# Patient Record
Sex: MALE | Race: BLACK OR AFRICAN AMERICAN | NOT HISPANIC OR LATINO | Employment: OTHER | URBAN - METROPOLITAN AREA
[De-identification: names, ages, dates, MRNs, and addresses within clinical notes are randomized per-mention and may not be internally consistent; named-entity substitution may affect disease eponyms.]

---

## 2018-01-30 ENCOUNTER — HOSPITAL ENCOUNTER (EMERGENCY)
Facility: OTHER | Age: 48
Discharge: HOME | End: 2018-01-30
Attending: EMERGENCY MEDICINE
Payer: COMMERCIAL

## 2018-01-30 VITALS
HEART RATE: 83 BPM | SYSTOLIC BLOOD PRESSURE: 125 MMHG | BODY MASS INDEX: 28.63 KG/M2 | WEIGHT: 200 LBS | HEIGHT: 70 IN | OXYGEN SATURATION: 96 % | TEMPERATURE: 99 F | DIASTOLIC BLOOD PRESSURE: 87 MMHG | RESPIRATION RATE: 17 BRPM

## 2018-01-30 DIAGNOSIS — J02.0 STREP PHARYNGITIS: Primary | ICD-10-CM

## 2018-01-30 PROCEDURE — 96372 THER/PROPH/DIAG INJ SC/IM: CPT

## 2018-01-30 PROCEDURE — 99283 EMERGENCY DEPT VISIT LOW MDM: CPT | Mod: 25

## 2018-01-30 PROCEDURE — 63600175 PHARM REV CODE 636 W HCPCS: Mod: JG | Performed by: PHYSICIAN ASSISTANT

## 2018-01-30 RX ORDER — DEXAMETHASONE SODIUM PHOSPHATE 4 MG/ML
12 INJECTION, SOLUTION INTRA-ARTICULAR; INTRALESIONAL; INTRAMUSCULAR; INTRAVENOUS; SOFT TISSUE
Status: COMPLETED | OUTPATIENT
Start: 2018-01-30 | End: 2018-01-30

## 2018-01-30 RX ORDER — OXAPROZIN 600 MG/1
600 TABLET, FILM COATED ORAL 2 TIMES DAILY PRN
Qty: 20 TABLET | Refills: 0 | Status: SHIPPED | OUTPATIENT
Start: 2018-01-30 | End: 2019-10-20

## 2018-01-30 RX ADMIN — PENICILLIN G BENZATHINE 1.2 MILLION UNITS: 1200000 INJECTION, SUSPENSION INTRAMUSCULAR at 05:01

## 2018-01-30 RX ADMIN — DEXAMETHASONE SODIUM PHOSPHATE 12 MG: 4 INJECTION, SOLUTION INTRAMUSCULAR; INTRAVENOUS at 05:01

## 2018-01-30 NOTE — ED PROVIDER NOTES
Encounter Date: 1/30/2018       History     Chief Complaint   Patient presents with    Sore Throat     sore throat with difficulty swallowing x 3 days. He states that he has been having night sweats, chills.      47-year-old male with no significant past medical history was a current some day smoker presents emergency department with complaints of sore throat since Saturday.  He states his symptoms have worsened.  He reports or limits of water without relief.  He reports subjective fever upon onset but denies any fever at this time.  He states that he attempted to eat earlier today however reports that he had to spend his food secondary to pain.  He admits to tolerating secretions and fluids.  He complains of pain that is a 10 out of 10.      The history is provided by the patient.     Review of patient's allergies indicates:   Allergen Reactions    Vicodin [hydrocodone-acetaminophen] Nausea Only     History reviewed. No pertinent past medical history.  Past Surgical History:   Procedure Laterality Date    ANKLE SURGERY       History reviewed. No pertinent family history.  Social History   Substance Use Topics    Smoking status: Current Some Day Smoker    Smokeless tobacco: Not on file    Alcohol use No      Comment: occasionally     Review of Systems   Constitutional: Positive for fever. Negative for chills.   HENT: Positive for sore throat and trouble swallowing. Negative for congestion, facial swelling and rhinorrhea.    Respiratory: Negative for cough and shortness of breath.    Cardiovascular: Negative for chest pain.   Gastrointestinal: Negative for nausea and vomiting.   Genitourinary: Negative for dysuria.   Musculoskeletal: Negative for back pain, neck pain and neck stiffness.   Skin: Negative for rash.   Neurological: Negative for weakness.   Hematological: Does not bruise/bleed easily.       Physical Exam     Initial Vitals [01/30/18 1458]   BP Pulse Resp Temp SpO2   133/86 98 18 98.7 °F (37.1 °C)  97 %      MAP       101.67         Physical Exam    Nursing note and vitals reviewed.  Constitutional: Vital signs are normal. He appears well-developed and well-nourished. He is not diaphoretic.  Non-toxic appearance. No distress.   HENT:   Head: Normocephalic and atraumatic.   Right Ear: External ear normal.   Left Ear: External ear normal.   Nose: Nose normal.   Mouth/Throat: Uvula is midline and mucous membranes are normal. Mucous membranes are not dry. No trismus in the jaw. No uvula swelling. Oropharyngeal exudate, posterior oropharyngeal edema and posterior oropharyngeal erythema present.   Patient has bilateral peritonsillar edema concerning for peritonsillar cellulitis without obvious abscess.  Uvula is midline.  Oral pharyngeal exudate noted.   Eyes: Conjunctivae, EOM and lids are normal. Pupils are equal, round, and reactive to light. No scleral icterus.   Neck: Normal range of motion and phonation normal. Neck supple.   Cardiovascular: Normal rate, regular rhythm and normal heart sounds. Exam reveals no gallop and no friction rub.    No murmur heard.  Pulmonary/Chest: Effort normal and breath sounds normal. No respiratory distress. He has no decreased breath sounds. He has no wheezes. He has no rhonchi. He has no rales.   Abdominal: Normal appearance.   Musculoskeletal: Normal range of motion.   No obvious deformities, moving all extremities, normal gait   Lymphadenopathy:     He has no cervical adenopathy.   Neurological: He is alert and oriented to person, place, and time. He has normal strength. No sensory deficit.   Skin: Skin is warm, dry and intact. No lesion and no rash noted. No erythema.   Psychiatric: He has a normal mood and affect. His speech is normal and behavior is normal. Judgment normal. Cognition and memory are normal.         ED Course   Procedures  Labs Reviewed - No data to display          Medical Decision Making:   History:   Old Medical Records: I decided to obtain old medical  records.  Initial Assessment:   47-year-old male with complaints consistent with strep pharyngitis.  Vital signs stable, afebrile, neurovascularly intact.  He is alert, healthy and nontoxic appearing.  He is in no apparent distress.  No focal neurological deficits.  Exam documented above.  Concerning for strep pharyngitis.  Patient does meet [facility] criteria for treatment.  I do not feel that testing is indicated.  No evidence of peritonsillar abscess, retropharyngeal abscess or Colton's angina.  ED Management:  He was administered Bicillin and Decadron in the emergency department.  We'll discharged home with oxaprozin and care instructions.  He is to follow-up with primary care physician in the next 48 hours or return for any worsening signs or symptoms.  Given strict return precautions.  He states understanding.  This patient was discussed with the attending physician who also evaluated him and agrees with treatment plan  Other:   I have discussed this case with another health care provider.       <> Summary of the Discussion: Ruben  This note was created using Dragon Medical dictation.  There may be typographical errors secondary to dictation.                     ED Course      Clinical Impression:     1. Strep pharyngitis          Disposition:   Disposition: Discharged  Condition: Stable                        Jenni Patel PA-C  01/30/18 3570

## 2018-01-30 NOTE — ED NOTES
Patient Identifiers for Quincy Hanson checked and correct  LOC: The patient is awake, alert and aware of environment with an appropriate affect, the patient is oriented x 3 and speaking appropriate.  APPEARANCE: Patient resting comfortably and in no acute distress. The patient is clean and well groomed. The patient's clothing is properly fastened.  SKIN: The skin is warm and dry. The patient has normal skin turgor and moist mucus membranes. No rashes or lesions upon observation. Skin Intact , no breakdown noted.  Musculoskeletal :  Normal range of motion noted. Moves all extremities well, No swelling or tenderness noted  RESPIRATORY: Airway is open and patent, respirations are spontaneous, patient has a normal effort and rate. Breath sounds are clear & equal, bilaterally.  CARDIAC: Patient has a normal rate and rhythm, no peripheral edema noted, capillary refill < 3 seconds.   ABDOMEN: Soft and non tender to palpation, no distention observed. Bowels Sounds are WNL all quads.  PULSES: 2+ radial & pedal pulses, symmetrical in all extremities.  NEUROLOGIC: PERRL, Pupils 3mm and reacts briskly to light. Motor strength 5/5 all extremities.  The pt's facial expression is symmetrical, patient moving all extremities, normal sensation in all extremities when touched with a finger.The patient is awake, alert and cooperative with a calm affect, patient is aware of environment.      Will continue to monitor

## 2019-10-20 ENCOUNTER — HOSPITAL ENCOUNTER (EMERGENCY)
Facility: OTHER | Age: 49
Discharge: HOME | End: 2019-10-20
Attending: EMERGENCY MEDICINE
Payer: COMMERCIAL

## 2019-10-20 VITALS
HEIGHT: 70 IN | SYSTOLIC BLOOD PRESSURE: 129 MMHG | WEIGHT: 220 LBS | TEMPERATURE: 98 F | OXYGEN SATURATION: 96 % | BODY MASS INDEX: 31.5 KG/M2 | RESPIRATION RATE: 16 BRPM | DIASTOLIC BLOOD PRESSURE: 79 MMHG | HEART RATE: 91 BPM

## 2019-10-20 DIAGNOSIS — M79.604 RIGHT LEG PAIN: Primary | ICD-10-CM

## 2019-10-20 PROCEDURE — 99284 EMERGENCY DEPT VISIT MOD MDM: CPT

## 2019-10-20 PROCEDURE — 25000003 PHARM REV CODE 250: Performed by: PHYSICIAN ASSISTANT

## 2019-10-20 RX ORDER — IBUPROFEN 600 MG/1
600 TABLET ORAL EVERY 6 HOURS PRN
Qty: 20 TABLET | Refills: 0 | Status: SHIPPED | OUTPATIENT
Start: 2019-10-20

## 2019-10-20 RX ORDER — METHOCARBAMOL 500 MG/1
1000 TABLET, FILM COATED ORAL 3 TIMES DAILY
Qty: 30 TABLET | Refills: 0 | Status: SHIPPED | OUTPATIENT
Start: 2019-10-20 | End: 2019-10-25

## 2019-10-20 RX ORDER — METHOCARBAMOL 500 MG/1
1000 TABLET, FILM COATED ORAL
Status: COMPLETED | OUTPATIENT
Start: 2019-10-20 | End: 2019-10-20

## 2019-10-20 RX ORDER — ACETAMINOPHEN 500 MG
1000 TABLET ORAL
Status: COMPLETED | OUTPATIENT
Start: 2019-10-20 | End: 2019-10-20

## 2019-10-20 RX ORDER — KETOROLAC TROMETHAMINE 10 MG/1
10 TABLET, FILM COATED ORAL
Status: COMPLETED | OUTPATIENT
Start: 2019-10-20 | End: 2019-10-20

## 2019-10-20 RX ADMIN — KETOROLAC TROMETHAMINE 10 MG: 10 TABLET, FILM COATED ORAL at 05:10

## 2019-10-20 RX ADMIN — METHOCARBAMOL TABLETS 1000 MG: 500 TABLET, COATED ORAL at 05:10

## 2019-10-20 RX ADMIN — ACETAMINOPHEN 1000 MG: 500 TABLET, FILM COATED ORAL at 05:10

## 2019-10-20 NOTE — ED PROVIDER NOTES
"Encounter Date: 10/20/2019       History     Chief Complaint   Patient presents with    Leg Pain     Pt says he has pain that starts in his right ankle and travels to his hip when he walks or lays down on it. Denies injury     Patient is 48 year old male who presents with complaints of right leg pain that has been present for three weeks PTA. He reports no inciting injury but does admit that symptoms seemed to worsen after a mechanical trip a fall while running about two weeks ago. He reports only feeling symptoms intermittently and admits it is almost like "I move the wrong way and something catches in my leg and makes me holler in pain". He reports that this has only happened a couple of times but he admits he is concerned that he is having a stroke. He denies fever, chills, nausea, vomiting, leg swelling, skin changes, bladder or bowel incontinence, saddle anesthesia. He is currently unaccompanied in the ER.         Review of patient's allergies indicates:   Allergen Reactions    Vicodin [hydrocodone-acetaminophen] Nausea Only     No past medical history on file.  Past Surgical History:   Procedure Laterality Date    ANKLE SURGERY       No family history on file.  Social History     Tobacco Use    Smoking status: Current Some Day Smoker   Substance Use Topics    Alcohol use: No     Comment: occasionally    Drug use: No     Review of Systems   Constitutional: Negative for fever.   HENT: Negative for sore throat.    Respiratory: Negative for shortness of breath.    Cardiovascular: Negative for chest pain.   Gastrointestinal: Negative for nausea.   Genitourinary: Negative for dysuria.   Musculoskeletal: Negative for back pain.        Right leg pain    Skin: Negative for rash.   Neurological: Negative for weakness.   Hematological: Does not bruise/bleed easily.       Physical Exam     Initial Vitals [10/20/19 1721]   BP Pulse Resp Temp SpO2   129/79 107 16 98.1 °F (36.7 °C) 96 %      MAP       --     "     Physical Exam    Nursing note and vitals reviewed.  Constitutional: He appears well-developed and well-nourished. He is not diaphoretic. No distress.   Healthy-appearing male in no acute distress or apparent pain. He makes good eye contact, speaks in clear full sentences and ambulates with ease.   HENT:   Head: Normocephalic and atraumatic.   Eyes: Conjunctivae and EOM are normal. Pupils are equal, round, and reactive to light. Right eye exhibits no discharge. Left eye exhibits no discharge. No scleral icterus.   Neck: Normal range of motion.   Cardiovascular: Normal rate, regular rhythm, normal heart sounds and intact distal pulses. Exam reveals no gallop and no friction rub.    No murmur heard.  Pulmonary/Chest: Breath sounds normal. He has no wheezes. He has no rhonchi. He has no rales.   Abdominal: Soft. Bowel sounds are normal. There is no tenderness. There is no rebound and no guarding.   Musculoskeletal: Normal range of motion. He exhibits no edema or tenderness.   There is no C T or L midline bony TTP crepitus or step-offs. There is negative straight leg raise. There is no reproducible TTP to the left leg. There is normal knee ROM. There is normal SAI. There is normal distal pulse.    Lymphadenopathy:     He has no cervical adenopathy.   Neurological: He is alert and oriented to person, place, and time. He has normal strength. No cranial nerve deficit or sensory deficit. GCS score is 15. GCS eye subscore is 4. GCS verbal subscore is 5. GCS motor subscore is 6.   Skin: Skin is warm. Capillary refill takes less than 2 seconds. No rash and no abscess noted. No erythema.   Psychiatric: He has a normal mood and affect. His behavior is normal. Thought content normal.         ED Course   Procedures  Labs Reviewed - No data to display       Imaging Results    None          Medical Decision Making:   ED Management:  Urgent evaluation a 48-year-old male who presents with complaints most consistent with muscle  spasm versus lumbar radiculopathy.  He is afebrile, nontoxic appearing, hemodynamically stable though initially tachycardic at 107 on triage exam.  Physical exam reveals normal bilateral lower extremities with normal pulses, strength against resistance in sensation to light touch.  There is normal range of motion at all joints.  Bony landmarks are palpated without any deformity or asymmetry.  There is no C, T, L midline bony tenderness crepitus or step-offs.  There is negative right straight leg raise.  I have considered but do not suspect acute neurovascular compromise including DVT, acute arterial compromise, cellulitis, fracture, dislocation.  Certainly ligamentous injury after his fall could be the culprit but there is no evidence of ligamentous laxity.  I suspect likely this is an element of sciatica and that symptoms will improve with nonsteroidal anti-inflammatories and nonsedating muscle relaxer.  First doses will be given here in the emergency department.  Patient is educated on importance of establishing care with [facility] contact information is provided to him.  He is educated on ED return precautions and verbalized understanding.  He is stable for discharge.    Of note d/c HR obtained by me and noted to be 91 bpm.                       Clinical Impression:       ICD-10-CM ICD-9-CM   1. Right leg pain M79.604 729.5                                Paulina Lambert PA-C  10/20/19 1816

## 2019-10-20 NOTE — ED TRIAGE NOTES
Patient reports Right leg pain for several weeks. Pt states the pain became more severe after falling a week ago.  Pt states the pain is now radiating to Right hip.  Pain 10/10.

## 2019-11-26 ENCOUNTER — HOSPITAL ENCOUNTER (EMERGENCY)
Facility: OTHER | Age: 49
Discharge: HOME | End: 2019-11-26
Attending: EMERGENCY MEDICINE
Payer: COMMERCIAL

## 2019-11-26 VITALS
HEART RATE: 91 BPM | OXYGEN SATURATION: 96 % | TEMPERATURE: 98 F | DIASTOLIC BLOOD PRESSURE: 88 MMHG | RESPIRATION RATE: 18 BRPM | BODY MASS INDEX: 31.5 KG/M2 | HEIGHT: 70 IN | WEIGHT: 220 LBS | SYSTOLIC BLOOD PRESSURE: 147 MMHG

## 2019-11-26 DIAGNOSIS — J34.89 RHINORRHEA: ICD-10-CM

## 2019-11-26 DIAGNOSIS — M54.31 SCIATICA OF RIGHT SIDE: Primary | ICD-10-CM

## 2019-11-26 PROCEDURE — 63600175 PHARM REV CODE 636 W HCPCS: Performed by: PHYSICIAN ASSISTANT

## 2019-11-26 PROCEDURE — 96372 THER/PROPH/DIAG INJ SC/IM: CPT

## 2019-11-26 PROCEDURE — 99284 EMERGENCY DEPT VISIT MOD MDM: CPT | Mod: 25

## 2019-11-26 PROCEDURE — 25000242 PHARM REV CODE 250 ALT 637 W/ HCPCS: Performed by: PHYSICIAN ASSISTANT

## 2019-11-26 PROCEDURE — 25000003 PHARM REV CODE 250: Performed by: PHYSICIAN ASSISTANT

## 2019-11-26 RX ORDER — METHOCARBAMOL 500 MG/1
1000 TABLET, FILM COATED ORAL
Status: COMPLETED | OUTPATIENT
Start: 2019-11-26 | End: 2019-11-26

## 2019-11-26 RX ORDER — FLUTICASONE PROPIONATE 50 MCG
2 SPRAY, SUSPENSION (ML) NASAL
Status: COMPLETED | OUTPATIENT
Start: 2019-11-26 | End: 2019-11-26

## 2019-11-26 RX ORDER — METHOCARBAMOL 500 MG/1
1000 TABLET, FILM COATED ORAL 3 TIMES DAILY PRN
Qty: 30 TABLET | Refills: 0 | Status: SHIPPED | OUTPATIENT
Start: 2019-11-26 | End: 2019-12-01

## 2019-11-26 RX ORDER — KETOROLAC TROMETHAMINE 30 MG/ML
10 INJECTION, SOLUTION INTRAMUSCULAR; INTRAVENOUS
Status: COMPLETED | OUTPATIENT
Start: 2019-11-26 | End: 2019-11-26

## 2019-11-26 RX ORDER — NAPROXEN 375 MG/1
375 TABLET ORAL 2 TIMES DAILY PRN
Qty: 30 TABLET | Refills: 0 | Status: SHIPPED | OUTPATIENT
Start: 2019-11-26

## 2019-11-26 RX ADMIN — FLUTICASONE PROPIONATE 100 MCG: 50 SPRAY, METERED NASAL at 09:11

## 2019-11-26 RX ADMIN — METHOCARBAMOL TABLETS 1000 MG: 500 TABLET, COATED ORAL at 09:11

## 2019-11-26 RX ADMIN — KETOROLAC TROMETHAMINE 10 MG: 30 INJECTION, SOLUTION INTRAMUSCULAR; INTRAVENOUS at 09:11

## 2019-11-27 NOTE — ED TRIAGE NOTES
Pt presents to the ed with c/o non traumatic R leg pain. Pt reports recently seen a month ago for the same pain and was prescribed medications but the meds are not working.    LOC: The patient is awake, alert, oriented and speaking appropriately at this time.  APPEARANCE: Patient resting comfortably and appears to be in no acute distress at this time. Patient is clean and well groomed, patient's clothing is properly fastened.  SKIN:The skin is warm and dry, color consistent with ethnicity, patient has normal skin turgor and moist mucus membranes, skin intact, no breakdown or brusing noted.  MUSCULOSKELETAL: +R leg pain. Patient moving all extremities well, no obvious swelling or deformities noted.  RESPIRATORY: Airway is open and patent, respirations are spontaneous, patient has a normal effort and rate, no accessory muscle use noted.  CARDIAC: Patient has a normal rate and rhythm, no periphreal edema noted in any extremity, capillary refill < 3 seconds in all extremities  ABDOMEN: Soft and non tender to palpation, no abdominal distention noted. Bowel sounds present in all four quadrants.  NEUROLOGIC: Eyes open spontaneously, behavior appropriate to situation, follows commands, facial expression symmetrical, bilateral hand grasp equal and even, purposeful motor response noted, normal sensation in all extremities when touched with a finger.

## 2019-11-27 NOTE — ED PROVIDER NOTES
"Encounter Date: 11/26/2019       History     Chief Complaint   Patient presents with    Leg Pain     R leg. "they told me I have a pinched nerve and the meds they gave me last time aren't working"     Patient is a 49-year-old male with no significant medical history who presents to the emergency department with the right leg pain. Patient states over the last several months he has had intermittent episodes of right leg pain.  He states the pain is a throbbing sensation.  He states the pain is worsened by walking and being active at work.  He states he also has pain with lying flat.  He denies any associated numbness or tingling.  He denies saddle anesthesia or bowel or bladder incontinence or retention.  He states he has been diagnosed with sciatica, but states he has not seen a back and Spine Specialist.  He denies any recent injury. He denies fevers or chills. Patient also reports congestion and rhinorrhea over the last 24 hr.  Denies cough.  Denies chest pain or shortness of breath.    The history is provided by the patient.     Review of patient's allergies indicates:   Allergen Reactions    Vicodin [hydrocodone-acetaminophen] Nausea Only     No past medical history on file.  Past Surgical History:   Procedure Laterality Date    ANKLE SURGERY       No family history on file.  Social History     Tobacco Use    Smoking status: Current Some Day Smoker   Substance Use Topics    Alcohol use: No     Comment: occasionally    Drug use: No     Review of Systems   Constitutional: Negative for activity change, appetite change, chills, fatigue and fever.   HENT: Positive for congestion and rhinorrhea. Negative for ear discharge, ear pain, sore throat and trouble swallowing.    Respiratory: Negative for cough and shortness of breath.    Cardiovascular: Negative for chest pain.   Gastrointestinal: Negative for abdominal pain, blood in stool, constipation, diarrhea, nausea and vomiting.   Genitourinary: Negative for " dysuria, flank pain and hematuria.   Musculoskeletal: Positive for back pain. Negative for neck pain and neck stiffness.   Skin: Negative for rash and wound.   Neurological: Negative for weakness, numbness and headaches.       Physical Exam     Initial Vitals [11/26/19 1912]   BP Pulse Resp Temp SpO2   (!) 154/77 102 16 98.6 °F (37 °C) 97 %      MAP       --         Physical Exam    Nursing note and vitals reviewed.  Constitutional: He appears well-developed and well-nourished. He is not diaphoretic.  Non-toxic appearance. No distress.   HENT:   Head: Normocephalic.   Right Ear: Hearing and external ear normal.   Left Ear: Hearing and external ear normal.   Nose: Nose normal.   Mouth/Throat: Uvula is midline, oropharynx is clear and moist and mucous membranes are normal. No oropharyngeal exudate.   Eyes: Conjunctivae are normal. Pupils are equal, round, and reactive to light.   Neck: Normal range of motion.   Cardiovascular: Normal rate and regular rhythm.   Pulmonary/Chest: Breath sounds normal.   Abdominal: Soft. Bowel sounds are normal. There is no tenderness.   Musculoskeletal:   No midline tenderness in the cervical, thoracic, or lumbar region.  No step-offs or crepitus noted.  No ecchymosis.  Normal strength in upper and lower extremities. Negative straight leg test bilaterally. No saddle anesthesia.  Normal strength in upper and lower extremities. No difficulty ambulating.  Tenderness in the right lumbar paraspinal region with tenderness over the right buttocks.   Lymphadenopathy:     He has no cervical adenopathy.   Neurological: He is alert and oriented to person, place, and time.   Skin: Skin is warm and dry. Capillary refill takes less than 2 seconds.   Psychiatric: He has a normal mood and affect.         ED Course   Procedures  Labs Reviewed - No data to display       Imaging Results    None          Medical Decision Making:   Initial Assessment:   Urgent evaluation of a 49-year-old male with no  significant medical history who presents to the emergency department with back pain. Patient is afebrile, nontoxic appearing, and hemodynamically stable. No history of injury. History of sciatica.  No midline tenderness of spine.  No evidence of vertebral fracture, spinal cord compression, cauda equina syndrome, or spinal cord abscess.  Patient will be treated with anti-inflammatories and muscle relaxers.  Patient also is complaining of nasal congestion and rhinorrhea.  Lungs are clear to auscultation.  There is nasal mucosal edema.  Patient will be given Flonase.  Patient is advised to follow up with PCP and [facility].  Patient is advised to return to the emergency department with any worsening symptoms or concerns.                                 Clinical Impression:       ICD-10-CM ICD-9-CM   1. Sciatica of right side M54.31 724.3   2. Rhinorrhea J34.89 478.19                             Sherine Lancaster PA-C  11/26/19 2103

## 2021-12-29 ENCOUNTER — IMMUNIZATION (OUTPATIENT)
Dept: PRIMARY CARE CLINIC | Facility: CLINIC | Age: 51
End: 2021-12-29

## 2021-12-29 DIAGNOSIS — Z23 NEED FOR VACCINATION: Primary | ICD-10-CM

## 2021-12-29 PROCEDURE — 91303 COVID-19,VECTOR-NR,RS-AD26,PF,0.5 ML DOSE VACCINE (JANSSEN): CPT | Mod: PBBFAC | Performed by: INTERNAL MEDICINE

## 2021-12-29 PROCEDURE — 0034A COVID-19,VECTOR-NR,RS-AD26,PF,0.5 ML DOSE VACCINE (JANSSEN): CPT | Mod: CV19,PBBFAC | Performed by: INTERNAL MEDICINE

## 2024-02-16 ENCOUNTER — HOSPITAL ENCOUNTER (EMERGENCY)
Facility: OTHER | Age: 54
Discharge: HOME | End: 2024-02-16
Attending: EMERGENCY MEDICINE
Payer: MEDICAID

## 2024-02-16 VITALS
BODY MASS INDEX: 34.36 KG/M2 | OXYGEN SATURATION: 100 % | TEMPERATURE: 98 F | SYSTOLIC BLOOD PRESSURE: 149 MMHG | HEART RATE: 87 BPM | RESPIRATION RATE: 14 BRPM | WEIGHT: 240 LBS | DIASTOLIC BLOOD PRESSURE: 95 MMHG | HEIGHT: 70 IN

## 2024-02-16 DIAGNOSIS — T54.91XA INGESTION OF CAUSTIC SUBSTANCE: Primary | ICD-10-CM

## 2024-02-16 LAB
ALBUMIN SERPL BCP-MCNC: 4 G/DL (ref 3.5–5.2)
ALP SERPL-CCNC: 79 U/L (ref 55–135)
ALT SERPL W/O P-5'-P-CCNC: 19 U/L (ref 10–44)
ANION GAP SERPL CALC-SCNC: 11 MMOL/L (ref 8–16)
AST SERPL-CCNC: 21 U/L (ref 10–40)
BASOPHILS # BLD AUTO: 0.03 K/UL (ref 0–0.2)
BASOPHILS NFR BLD: 0.5 % (ref 0–1.9)
BILIRUB SERPL-MCNC: 0.3 MG/DL (ref 0.1–1)
BUN SERPL-MCNC: 7 MG/DL (ref 6–20)
CALCIUM SERPL-MCNC: 9.3 MG/DL (ref 8.7–10.5)
CHLORIDE SERPL-SCNC: 106 MMOL/L (ref 95–110)
CO2 SERPL-SCNC: 22 MMOL/L (ref 23–29)
CREAT SERPL-MCNC: 0.9 MG/DL (ref 0.5–1.4)
DIFFERENTIAL METHOD BLD: NORMAL
EOSINOPHIL # BLD AUTO: 0.1 K/UL (ref 0–0.5)
EOSINOPHIL NFR BLD: 1 % (ref 0–8)
ERYTHROCYTE [DISTWIDTH] IN BLOOD BY AUTOMATED COUNT: 14 % (ref 11.5–14.5)
EST. GFR  (NO RACE VARIABLE): >60 ML/MIN/1.73 M^2
GLUCOSE SERPL-MCNC: 111 MG/DL (ref 70–110)
HCT VFR BLD AUTO: 43.5 % (ref 40–54)
HGB BLD-MCNC: 14.9 G/DL (ref 14–18)
IMM GRANULOCYTES # BLD AUTO: 0.01 K/UL (ref 0–0.04)
IMM GRANULOCYTES NFR BLD AUTO: 0.2 % (ref 0–0.5)
LYMPHOCYTES # BLD AUTO: 2.5 K/UL (ref 1–4.8)
LYMPHOCYTES NFR BLD: 41.1 % (ref 18–48)
MAGNESIUM SERPL-MCNC: 2 MG/DL (ref 1.6–2.6)
MCH RBC QN AUTO: 28.2 PG (ref 27–31)
MCHC RBC AUTO-ENTMCNC: 34.3 G/DL (ref 32–36)
MCV RBC AUTO: 82 FL (ref 82–98)
MONOCYTES # BLD AUTO: 0.7 K/UL (ref 0.3–1)
MONOCYTES NFR BLD: 11.8 % (ref 4–15)
NEUTROPHILS # BLD AUTO: 2.8 K/UL (ref 1.8–7.7)
NEUTROPHILS NFR BLD: 45.4 % (ref 38–73)
NRBC BLD-RTO: 0 /100 WBC
OHS QRS DURATION: 104 MS
OHS QTC CALCULATION: 477 MS
PLATELET # BLD AUTO: 228 K/UL (ref 150–450)
PMV BLD AUTO: 10.5 FL (ref 9.2–12.9)
POTASSIUM SERPL-SCNC: 3.9 MMOL/L (ref 3.5–5.1)
PROT SERPL-MCNC: 7.6 G/DL (ref 6–8.4)
RBC # BLD AUTO: 5.29 M/UL (ref 4.6–6.2)
SODIUM SERPL-SCNC: 139 MMOL/L (ref 136–145)
TROPONIN I SERPL DL<=0.01 NG/ML-MCNC: 0.02 NG/ML (ref 0–0.03)
WBC # BLD AUTO: 6.1 K/UL (ref 3.9–12.7)

## 2024-02-16 PROCEDURE — 80053 COMPREHEN METABOLIC PANEL: CPT | Performed by: EMERGENCY MEDICINE

## 2024-02-16 PROCEDURE — 99284 EMERGENCY DEPT VISIT MOD MDM: CPT | Mod: 25

## 2024-02-16 PROCEDURE — 83735 ASSAY OF MAGNESIUM: CPT | Performed by: EMERGENCY MEDICINE

## 2024-02-16 PROCEDURE — 93010 ELECTROCARDIOGRAM REPORT: CPT | Mod: ,,, | Performed by: INTERNAL MEDICINE

## 2024-02-16 PROCEDURE — 93005 ELECTROCARDIOGRAM TRACING: CPT

## 2024-02-16 PROCEDURE — 84484 ASSAY OF TROPONIN QUANT: CPT | Performed by: EMERGENCY MEDICINE

## 2024-02-16 PROCEDURE — 85025 COMPLETE CBC W/AUTO DIFF WBC: CPT | Performed by: EMERGENCY MEDICINE

## 2024-02-16 NOTE — ED TRIAGE NOTES
"Pt reports to ED after he was working on his car yesterday and anti-freeze splashed in his face and on his left arm. Pt states some of it went to his mouth and he accidentally swallowed it. Burn noted to left forearm. Pt denies any symptoms at this time and states "I just wanted to get checked out". Aaox4.   "

## 2024-02-16 NOTE — ED PROVIDER NOTES
"     Source of History:  The patient    Chief complaint:  Ingestion (Pt reports he accidentally ingested anti freeze yesterday at approximately 1400. Now he "feels funny". + dizziness. + abd pain. - N/V/D.) and Rash (Pt also notes area on his L FA that is irritated from coming in contact with the antifreeze. Skin intact. Slightly reddened.)      HPI:  Quincy Hanson is a 53 y.o. male  who is concerned of ingestion of antifreeze.  Was working underneath a car yesterday about 18 hours prior to being seen in which some splashed on his arm is face around the lips.  Steady spit some out but think some went down his throat.  Denies any symptoms at this time but was doing reading on the Internet seeing that it could be potentially dangerous want to get evaluated.    This is the extent to the patients complaints today here in the emergency department.    ROS:   See HPI.    Review of patient's allergies indicates:   Allergen Reactions    Vicodin [hydrocodone-acetaminophen] Nausea Only       PMH:  As per HPI and below:  History reviewed. No pertinent past medical history.  Past Surgical History:   Procedure Laterality Date    ANKLE SURGERY         Social History     Tobacco Use    Smoking status: Some Days   Substance Use Topics    Alcohol use: No     Comment: occasionally    Drug use: No       Physical Exam:    BP (!) 155/91   Pulse 84   Temp 98.1 °F (36.7 °C)   Resp 20   Ht 5' 10" (1.778 m)   Wt 108.9 kg (240 lb)   SpO2 100%   BMI 34.44 kg/m²   Nursing note and vital signs reviewed.  Constitutional: No acute distress.  Nontoxic  Cardiovascular: Regular rate and rhythm.  No murmurs. No gallops. No rubs  Respiratory: Clear to auscultation bilaterally.  Good air movement.  No wheezes.  No rhonchi. No rales. No accessory muscle use..  Abdomen: Soft.  Not distended.  Nontender.  No guarding.  No rebound. Non-peritoneal.  Musculoskeletal: Good range of motion all joints.  No deformities.  Neck supple.  No " meningismus.  Extremities: No pitting edema  Neuro: alert. At baseline.    Summary of Previous Medical Records:      Differential Dx considered but not limited to:    Metabolic derangement, acute kidney injury, ethylene glycol poisoning    MDM/ Workup:  Patient is asymptomatic in the amount of possible ingestion was very minimal.  Will discuss with poison control get labs and observe.      ED Course as of 02/16/24 0749   Fri Feb 16, 2024   0644 WBC: 6.10 [SM]   0644 Hemoglobin: 14.9 [SM]   0644 Platelet Count: 228 [SM]   0658 EKG 12-lead  EKG independently interpreted by myself shows normal sinus rhythm at a rate of 98, normal intervals, narrow QRS, no acute ST T wave abnormalities.  Overall impression normal EKG. [SM]   0712 Sodium: 139 [SM]   0712 Potassium: 3.9 [SM]   0712 Creatinine: 0.9 [SM]   0712 BUN: 7 [SM]   0712 Troponin I: 0.018 [SM]   0712 Magnesium : 2.0 [SM]   0717 Did speak with poison control discussed the case.  Based on the minimal ingestion he stated recommendations are if labs are normal can be discharged home with outpatient precautions. [SM]   0747 Labs are unremarkable.  Patient is asymptomatic.  Will discharge.    No further workup is indicated in the emergency department today.  I updated pt regarding results and I counseled pt regarding supportive care measures.  Diagnosis and treatment plan explained to patient. I have answered all questions and the patient is satisfied with the plan of care. Patient discharged home in stable condition.  [SM]      ED Course User Index  [SM] Lenny Song,                  Diagnostic Impression:    1. Ingestion of caustic substance         ED Disposition Condition    Discharge Stable            ED Prescriptions    None       Follow-up Information       Follow up With Specialties Details Why Contact Info    [facility] Emergency Dept Emergency Medicine  If symptoms worsen [address] [id number] [phone]              Lenny Song, DO  02/16/24 0749

## 2025-07-09 ENCOUNTER — HOSPITAL ENCOUNTER (EMERGENCY)
Facility: OTHER | Age: 55
Discharge: HOME | End: 2025-07-09
Attending: EMERGENCY MEDICINE
Payer: MEDICAID

## 2025-07-09 VITALS
OXYGEN SATURATION: 97 % | DIASTOLIC BLOOD PRESSURE: 102 MMHG | BODY MASS INDEX: 31.5 KG/M2 | HEIGHT: 70 IN | SYSTOLIC BLOOD PRESSURE: 146 MMHG | HEART RATE: 80 BPM | TEMPERATURE: 99 F | RESPIRATION RATE: 18 BRPM | WEIGHT: 220 LBS

## 2025-07-09 DIAGNOSIS — M25.562 LEFT KNEE PAIN: ICD-10-CM

## 2025-07-09 DIAGNOSIS — M25.552 LEFT HIP PAIN: Primary | ICD-10-CM

## 2025-07-09 PROCEDURE — 96372 THER/PROPH/DIAG INJ SC/IM: CPT

## 2025-07-09 PROCEDURE — 25000003 PHARM REV CODE 250

## 2025-07-09 PROCEDURE — 63600175 PHARM REV CODE 636 W HCPCS: Mod: JZ,TB

## 2025-07-09 PROCEDURE — 99284 EMERGENCY DEPT VISIT MOD MDM: CPT | Mod: 25

## 2025-07-09 RX ORDER — KETOROLAC TROMETHAMINE 30 MG/ML
15 INJECTION, SOLUTION INTRAMUSCULAR; INTRAVENOUS
Status: COMPLETED | OUTPATIENT
Start: 2025-07-09 | End: 2025-07-09

## 2025-07-09 RX ORDER — METHOCARBAMOL 500 MG/1
1000 TABLET, FILM COATED ORAL 3 TIMES DAILY PRN
Qty: 15 TABLET | Refills: 0 | Status: SHIPPED | OUTPATIENT
Start: 2025-07-09 | End: 2025-07-14

## 2025-07-09 RX ORDER — IBUPROFEN 800 MG/1
800 TABLET, FILM COATED ORAL EVERY 6 HOURS PRN
Qty: 20 TABLET | Refills: 0 | Status: SHIPPED | OUTPATIENT
Start: 2025-07-09 | End: 2025-07-14

## 2025-07-09 RX ORDER — DICLOFENAC SODIUM 10 MG/G
2 GEL TOPICAL 2 TIMES DAILY
Qty: 100 G | Refills: 0 | Status: SHIPPED | OUTPATIENT
Start: 2025-07-09 | End: 2025-07-14

## 2025-07-09 RX ORDER — OXYCODONE HYDROCHLORIDE 5 MG/1
5 TABLET ORAL
Refills: 0 | Status: COMPLETED | OUTPATIENT
Start: 2025-07-09 | End: 2025-07-09

## 2025-07-09 RX ORDER — LIDOCAINE 50 MG/G
2 PATCH TOPICAL DAILY
Qty: 10 PATCH | Refills: 0 | Status: SHIPPED | OUTPATIENT
Start: 2025-07-09 | End: 2025-07-14

## 2025-07-09 RX ORDER — ORPHENADRINE CITRATE 100 MG/1
100 TABLET, EXTENDED RELEASE ORAL
Status: COMPLETED | OUTPATIENT
Start: 2025-07-09 | End: 2025-07-09

## 2025-07-09 RX ADMIN — ORPHENADRINE CITRATE 100 MG: 100 TABLET, EXTENDED RELEASE ORAL at 03:07

## 2025-07-09 RX ADMIN — OXYCODONE HYDROCHLORIDE 5 MG: 5 TABLET ORAL at 05:07

## 2025-07-09 RX ADMIN — KETOROLAC TROMETHAMINE 15 MG: 30 INJECTION, SOLUTION INTRAMUSCULAR at 03:07

## 2025-07-09 NOTE — ED PROVIDER NOTES
Encounter Date: 7/9/2025       History     Chief Complaint   Patient presents with    Leg Pain     Per pt he has had intermittent pain from left hip to left lower leg x 1 week. Denies injury or trauma, Reports resting in certain positions helps with pain.      Quincy Hanson is a 54 y.o. healthy male presenting to the emergency department for evaluation of left, lateral hip pain for approximately 1 week.  He states that the pain radiates down the left leg and stops at the left knee.  He denies recent falls or trauma.  States that he has been sleeping on a couch in which there is a piece of metal that protrudes from the area.  He thinks that the metal was pressing against his hip bone, which may be contributing to his pain.  He denies history of hip injury or surgery.  No pain relief with aspirin.  He states that lying in certain positions or lying on the left hip does provide pain relief.  He denies fever, chills, leg swelling, numbness, tingling, or unilateral weakness.  He has been ambulatory.      The history is provided by the patient.     Review of patient's allergies indicates:   Allergen Reactions    Vicodin [hydrocodone-acetaminophen] Nausea Only     History reviewed. No pertinent past medical history.  Past Surgical History:   Procedure Laterality Date    ANKLE SURGERY       No family history on file.  Social History[1]    Review of Systems  As per HPI.    Physical Exam     Initial Vitals [07/09/25 1427]   BP Pulse Resp Temp SpO2   (!) 153/102 92 18 97.8 °F (36.6 °C) 96 %      MAP       --         Physical Exam    Vitals reviewed.  Constitutional: He appears well-developed and well-nourished. No distress.   HENT:   Head: Normocephalic and atraumatic.   Nose: Nose normal. Mouth/Throat: Oropharynx is clear and moist.   Eyes: Conjunctivae and EOM are normal.   Neck: Neck supple.   Normal range of motion.  Cardiovascular:  Normal rate, regular rhythm, normal heart sounds and intact distal pulses.            Pulses:       Dorsalis pedis pulses are 2+ on the right side and 2+ on the left side.        Posterior tibial pulses are 2+ on the right side and 2+ on the left side.   Pulmonary/Chest: Breath sounds normal. No respiratory distress. He has no wheezes. He has no rhonchi. He has no rales. He exhibits no tenderness.   Musculoskeletal:         General: No edema. Normal range of motion.      Cervical back: Normal range of motion and neck supple.      Comments: Pain with but normal range of motion of the left hip.  Normal range of motion of the left knee, left ankle, and left toes.  No edema, crepitus, or deformity.  No overlying skin changes.     Neurological: He is alert and oriented to person, place, and time. He has normal strength.   Skin: Skin is warm and dry. Capillary refill takes less than 2 seconds. No rash noted. No erythema.   Psychiatric: He has a normal mood and affect. His behavior is normal. Judgment and thought content normal.         ED Course   Procedures  Labs Reviewed - No data to display       Imaging Results              X-Ray Knee 3 View Left (Final result)  Result time 07/09/25 16:51:37      Final result by Marva Reyes MD (07/09/25 16:51:37)                   Impression:      No acute osseous abnormality seen.      Electronically signed by: Marva Reyes  Date:    07/09/2025  Time:    16:51               Narrative:    EXAMINATION:  XR KNEE 3 VIEW LEFT    CLINICAL HISTORY:  Pain in left knee    TECHNIQUE:  AP, lateral, and Merchant views of the left knee were performed.    COMPARISON:  None    FINDINGS:  No acute fracture or dislocation seen.  No soft tissue edema or significant suprapatellar joint effusion.  No radiopaque retained foreign body.                                       X-Ray Hip 2 or 3 views Left with Pelvis when performed (Final result)  Result time 07/09/25 16:51:21      Final result by Marva Reyes MD (07/09/25 16:51:21)                   Impression:      No acute  fracture or dislocation seen.      Electronically signed by: Marva Reyes  Date:    07/09/2025  Time:    16:51               Narrative:    EXAMINATION:  XR HIP WITH PELVIS WHEN PERFORMED 2 OR 3 VIEWS LEFT    CLINICAL HISTORY:  left hip pain;    TECHNIQUE:  AP view of the pelvis and frog leg lateral view of the left hip were performed.    COMPARISON:  None    FINDINGS:  The femoral head is well located with respect to the acetabulum. No acute fracture seen.  Osteophyte formation without significant narrowing of the femoroacetabular joint.    No significant soft tissue edema or radiopaque retained foreign body.                                       Medications   ketorolac injection 15 mg (15 mg Intramuscular Given 7/9/25 1541)   orphenadrine 12 hr tablet 100 mg (100 mg Oral Given 7/9/25 1541)   oxyCODONE immediate release tablet 5 mg (5 mg Oral Given 7/9/25 1462)     Medical Decision Making  Amount and/or Complexity of Data Reviewed  Radiology: ordered.    Risk  Prescription drug management.                          Medical Decision Making:   Initial Assessment:   Urgent evaluation of healthy 54-year-old male presenting for left, lateral hip pain for approximately 1 week.  Refuses the pain radiates down her left leg and stops of the left knee.  He denies recent falls or trauma.  States that he has been sleeping on a couch that has a protruding piece of metal.  He thinks that the metal was pressing against his hip bone.  No history of left hip injury or surgery.  No pain relief with multiple doses of aspirin.  Brief at lying in certain positions or lying on the left hip does provide pain relief.  No fever, chills, swelling, paresthesias, weakness, color change, or temperature change.  On exam, he is well-appearing and nontoxic.  Hypertensive but rest of vital signs are within normal limits.  Afebrile in the ED. Pain with but normal range of motion of the left hip.  Normal range of motion of the left knee, left ankle,  and left toes.  No edema, crepitus, or deformity.  No overlying skin changes.  Left lower extremity is neurovascularly intact.  Plan for x-ray of the left hip and left knee.  Differential Diagnosis:   Differential diagnosis includes but not limited to hip strain, sprain, fracture, dislocation, mass, hematoma, contusion, sciatica  Clinical Tests:   Radiological Study: Ordered and Reviewed  ED Management:  On review of x-rays of the left hip and left knee, there are no acute fractures or dislocations.  No foreign bodies.  He does have osteophyte formation in the left hip.  I updated the patient on all results.  He does report some improvement of his pain after receiving medications.  He is ambulatory with a limp. Discharge with prescriptions for anti-inflammatories, muscle relaxer, and lidocaine patches.  Ambulatory referral to physical therapy and Orthopedics were provided.  Advised him to refrain from sleeping on the couch.  Patient verbalized understanding and agreement with this plan of care. He was given specific return precautions. Advised to follow up with PCP as needed. All questions and concerns addressed. He is stable for discharge.     The patient presented to the ED with elevated blood pressure. Based on the patient's presentation today, I do not see any signs of end organ damage representative of a hypertensive emergency. I do not believe the patient's presentation requires further intervention in the Emergency Department to acutely decrease their blood pressure. I have given the patient specific return precautions and instructions to follow up with their PCP. The patient demonstrated understanding and felt comfortable with this treatment plan.     This note was created with MModal Fluency Direct Dictation. Please excuse any spelling or grammatical errors.             Clinical Impression:  Final diagnoses:  [M25.562] Left knee pain  [M25.552] Left hip pain (Primary)          ED Disposition Condition     Discharge Stable          ED Prescriptions       Medication Sig Dispense Start Date End Date Auth. Provider    ibuprofen (ADVIL,MOTRIN) 800 MG tablet Take 1 tablet (800 mg total) by mouth every 6 (six) hours as needed for Pain. 20 tablet 7/9/2025 7/14/2025 Maximo Bach PA-C    methocarbamoL (ROBAXIN) 500 MG Tab Take 2 tablets (1,000 mg total) by mouth 3 (three) times daily as needed (pain/spasms). 15 tablet 7/9/2025 7/14/2025 Maximo Bach PA-C    LIDOcaine (LIDODERM) 5 % Place 2 patches onto the skin once daily. Remove & Discard patch within 12 hours or as directed by MD for 5 days 10 patch 7/9/2025 7/14/2025 Maximo Bach PA-C    diclofenac sodium (VOLTAREN) 1 % Gel Apply 2 g topically 2 (two) times daily. for 5 days 100 g 7/9/2025 7/14/2025 Maximo Bach PA-C          Follow-up Information    None       Launch MDCalc MDM  MDCalc MDM Module  Jul 10 2025 12:44 PM [Maximo Bach]  Data:  - Independent interpretation: I independently reviewed the XR Pelvis and Hip-L 2V, XR Knee-L 3V. See MDM section and/or ED Course for my interpretation. [Maximo Bach]  - Test/documents/historian: 2 tests ordered  Additional encounter diagnoses: Left knee pain, Left hip pain  Risk: RX: methocarbamol tablet + 4 more (Rx drug management)                 [1]   Social History  Tobacco Use    Smoking status: Some Days   Substance Use Topics    Alcohol use: No     Comment: occasionally    Drug use: No        Maximo Bach PA-C  07/10/25 [id number]

## 2025-07-09 NOTE — ED TRIAGE NOTES
Pt arrives to the ED today w/ complaint of left hip pain radiating down to the left leg for one week. Denies any trauma. States he might have slept on it wrong, has taken OTC anti-inflammatories with no relief. Pt BP elevated, denies hx of HTN, HA, blurred vision, N/V.

## 2025-07-09 NOTE — DISCHARGE INSTRUCTIONS
Take prescribed medications as needed for pain. Avoid any heavy lifting or strenuous exercise. Avoid sleeping on that couch. Follow up with PT and orthopedics. Referral has been provided.

## 2025-07-30 ENCOUNTER — TELEPHONE (OUTPATIENT)
Dept: ORTHOPEDICS | Facility: CLINIC | Age: 55
End: 2025-07-30
Payer: MEDICAID

## 2025-07-30 NOTE — TELEPHONE ENCOUNTER
LVM informing pt that appointment on 08/11 was scheduled incorrectly and has to be cancelled. Left the Medicaid expansion line number as well as call back number for department.